# Patient Record
Sex: FEMALE | Race: WHITE
[De-identification: names, ages, dates, MRNs, and addresses within clinical notes are randomized per-mention and may not be internally consistent; named-entity substitution may affect disease eponyms.]

---

## 2018-06-08 ENCOUNTER — HOSPITAL ENCOUNTER (INPATIENT)
Dept: HOSPITAL 88 - ER | Age: 63
LOS: 5 days | Discharge: HOME | DRG: 389 | End: 2018-06-13
Attending: INTERNAL MEDICINE | Admitting: INTERNAL MEDICINE
Payer: COMMERCIAL

## 2018-06-08 VITALS — BODY MASS INDEX: 26.84 KG/M2 | HEIGHT: 66 IN | WEIGHT: 167 LBS

## 2018-06-08 VITALS — SYSTOLIC BLOOD PRESSURE: 119 MMHG | DIASTOLIC BLOOD PRESSURE: 56 MMHG

## 2018-06-08 DIAGNOSIS — R73.9: ICD-10-CM

## 2018-06-08 DIAGNOSIS — E16.2: ICD-10-CM

## 2018-06-08 DIAGNOSIS — E03.9: ICD-10-CM

## 2018-06-08 DIAGNOSIS — K51.90: ICD-10-CM

## 2018-06-08 DIAGNOSIS — K56.50: Primary | ICD-10-CM

## 2018-06-08 DIAGNOSIS — N39.0: ICD-10-CM

## 2018-06-08 DIAGNOSIS — E66.9: ICD-10-CM

## 2018-06-08 DIAGNOSIS — E78.5: ICD-10-CM

## 2018-06-08 DIAGNOSIS — Z85.038: ICD-10-CM

## 2018-06-08 LAB
ALBUMIN SERPL-MCNC: 4.1 G/DL (ref 3.5–5)
ALBUMIN/GLOB SERPL: 1.2 {RATIO} (ref 0.8–2)
ALP SERPL-CCNC: 137 IU/L (ref 40–150)
ALT SERPL-CCNC: 30 IU/L (ref 0–55)
ANION GAP SERPL CALC-SCNC: 14 MMOL/L (ref 8–16)
BACTERIA URNS QL MICRO: (no result) /HPF
BASOPHILS # BLD AUTO: 0 10*3/UL (ref 0–0.1)
BASOPHILS NFR BLD AUTO: 0.4 % (ref 0–1)
BILIRUB UR QL: NEGATIVE
BUN SERPL-MCNC: 20 MG/DL (ref 7–26)
BUN/CREAT SERPL: 22 (ref 6–25)
CALCIUM SERPL-MCNC: 10.8 MG/DL (ref 8.4–10.2)
CAOX CRY URNS QL MICRO: (no result)
CHLORIDE SERPL-SCNC: 103 MMOL/L (ref 98–107)
CK MB SERPL-MCNC: 1.7 NG/ML (ref 0–5)
CK SERPL-CCNC: 34 IU/L (ref 29–168)
CLARITY UR: (no result)
CO2 SERPL-SCNC: 30 MMOL/L (ref 22–29)
COLOR UR: YELLOW
DEPRECATED APTT PLAS QN: 24.1 SECONDS (ref 23.8–35.5)
DEPRECATED INR PLAS: 0.96
DEPRECATED NEUTROPHILS # BLD AUTO: 7.3 10*3/UL (ref 2.1–6.9)
DEPRECATED RBC URNS MANUAL-ACNC: (no result) /HPF (ref 0–5)
EGFRCR SERPLBLD CKD-EPI 2021: > 60 ML/MIN (ref 60–?)
EOSINOPHIL # BLD AUTO: 0.1 10*3/UL (ref 0–0.4)
EOSINOPHIL NFR BLD AUTO: 0.8 % (ref 0–6)
EPI CELLS URNS QL MICRO: (no result) /LPF
ERYTHROCYTE [DISTWIDTH] IN CORD BLOOD: 13.7 % (ref 11.7–14.4)
GLOBULIN PLAS-MCNC: 3.4 G/DL (ref 2.3–3.5)
GLUCOSE SERPLBLD-MCNC: 129 MG/DL (ref 74–118)
HCT VFR BLD AUTO: 44.8 % (ref 34.2–44.1)
HGB BLD-MCNC: 14.6 G/DL (ref 12–16)
KETONES UR QL STRIP.AUTO: (no result)
LEUKOCYTE ESTERASE UR QL STRIP.AUTO: (no result)
LYMPHOCYTES # BLD: 1.2 10*3/UL (ref 1–3.2)
LYMPHOCYTES NFR BLD AUTO: 13.6 % (ref 18–39.1)
MCH RBC QN AUTO: 28.4 PG (ref 28–32)
MCHC RBC AUTO-ENTMCNC: 32.6 G/DL (ref 31–35)
MCV RBC AUTO: 87.2 FL (ref 81–99)
MONOCYTES # BLD AUTO: 0.5 10*3/UL (ref 0.2–0.8)
MONOCYTES NFR BLD AUTO: 5.4 % (ref 4.4–11.3)
MUCOUS THREADS URNS QL MICRO: (no result)
NEUTS SEG NFR BLD AUTO: 79.5 % (ref 38.7–80)
NITRITE UR QL STRIP.AUTO: NEGATIVE
PLATELET # BLD AUTO: 319 X10E3/UL (ref 140–360)
POTASSIUM SERPL-SCNC: 4 MMOL/L (ref 3.5–5.1)
PROT UR QL STRIP.AUTO: (no result)
PROTHROMBIN TIME: 12 SECONDS (ref 11.9–14.5)
RBC # BLD AUTO: 5.14 X10E6/UL (ref 3.6–5.1)
SODIUM SERPL-SCNC: 143 MMOL/L (ref 136–145)
SP GR UR STRIP: 1.01 (ref 1.01–1.02)
UROBILINOGEN UR STRIP-MCNC: 0.2 MG/DL (ref 0.2–1)

## 2018-06-08 PROCEDURE — 82553 CREATINE MB FRACTION: CPT

## 2018-06-08 PROCEDURE — 82550 ASSAY OF CK (CPK): CPT

## 2018-06-08 PROCEDURE — 85610 PROTHROMBIN TIME: CPT

## 2018-06-08 PROCEDURE — 85025 COMPLETE CBC W/AUTO DIFF WBC: CPT

## 2018-06-08 PROCEDURE — 84484 ASSAY OF TROPONIN QUANT: CPT

## 2018-06-08 PROCEDURE — 80053 COMPREHEN METABOLIC PANEL: CPT

## 2018-06-08 PROCEDURE — 99285 EMERGENCY DEPT VISIT HI MDM: CPT

## 2018-06-08 PROCEDURE — 80061 LIPID PANEL: CPT

## 2018-06-08 PROCEDURE — 83036 HEMOGLOBIN GLYCOSYLATED A1C: CPT

## 2018-06-08 PROCEDURE — 85730 THROMBOPLASTIN TIME PARTIAL: CPT

## 2018-06-08 PROCEDURE — 74177 CT ABD & PELVIS W/CONTRAST: CPT

## 2018-06-08 PROCEDURE — 74022 RADEX COMPL AQT ABD SERIES: CPT

## 2018-06-08 PROCEDURE — 96361 HYDRATE IV INFUSION ADD-ON: CPT

## 2018-06-08 PROCEDURE — 36415 COLL VENOUS BLD VENIPUNCTURE: CPT

## 2018-06-08 PROCEDURE — 81001 URINALYSIS AUTO W/SCOPE: CPT

## 2018-06-08 PROCEDURE — 93005 ELECTROCARDIOGRAM TRACING: CPT

## 2018-06-08 PROCEDURE — 74018 RADEX ABDOMEN 1 VIEW: CPT

## 2018-06-08 RX ADMIN — SODIUM CHLORIDE PRN MG: 900 INJECTION INTRAVENOUS at 23:43

## 2018-06-08 RX ADMIN — HYDROMORPHONE HYDROCHLORIDE PRN MG: 1 INJECTION, SOLUTION INTRAMUSCULAR; INTRAVENOUS; SUBCUTANEOUS at 23:43

## 2018-06-08 RX ADMIN — SODIUM CHLORIDE SCH MLS/HR: 9 INJECTION, SOLUTION INTRAVENOUS at 21:20

## 2018-06-08 RX ADMIN — TAZOBACTAM SODIUM AND PIPERACILLIN SODIUM SCH MLS/HR: 375; 3 INJECTION, SOLUTION INTRAVENOUS at 20:21

## 2018-06-08 RX ADMIN — SODIUM CHLORIDE SCH ML: 900 IRRIGANT IRRIGATION at 19:57

## 2018-06-08 RX ADMIN — FAMOTIDINE SCH MG: 10 INJECTION, SOLUTION INTRAVENOUS at 20:21

## 2018-06-08 RX ADMIN — SODIUM CHLORIDE SCH ML: 900 IRRIGANT IRRIGATION at 19:58

## 2018-06-08 NOTE — DIAGNOSTIC IMAGING REPORT
PROCEDURE: CT ABDOMEN AND PELVIS WITH CONTRAST

 

TECHNIQUE: 

The abdomen and pelvis were scanned utilizing a multidetector helical 

scanner from the diaphragm to the lesser trochanter after the IV 

administration of 100 cc of Isovue 370 and the oral administration of 

water.  Coronal and sagittal multiplanar reformations were obtained.

 

COMPARISON: CT of the abdomen and pelvis from 5/26/2017 and 7/1/2016

 

INDICATIONS:   MID ABDOMINAL PAIN

 

FINDINGS:

LOWER THORAX: Subsegmental atelectasis in the lung bases.

 

HEPATOBILIARY: No focal hepatic lesions.  No biliary ductal dilatation.

 

SPLEEN: No splenomegaly.

 

PANCREAS: No focal masses or ductal dilatation.

 

ADRENALS: No right adrenal nodules. There is diffuse thickening of the 

left adrenal gland, likely due to hyperplasia.

 

KIDNEYS/URETERS: No hydronephrosis, stones, or solid mass lesions.

 

PELVIC ORGANS/BLADDER: The uterus is absent. The bladder is 

unremarkable.

 

PERITONEUM / RETROPERITONEUM: No free air or fluid.

 

LYMPH NODES: No lymphadenopathy.

 

VESSELS: Moderate atherosclerotic calcification of the aorta and its 

branches.

 

GI TRACT: Post surgical changes of prior low anterior resection with 

anastomotic suture line in the low rectum.

 

There is diffuse dilatation of the distal jejunal and ileum, measuring 

up to 3.4 cm. There is a transition point in the pelvis in the distal 

ileum (series 2, image 55).

 

BONES AND SOFT TISSUES: Bilateral breast implants are partially 

visualized.

 

IMPRESSION:

 

Small bowel obstruction with transition point in the pelvis, in the 

distal ileum. Suspect adhesions as an etiology.

 

Overall, the findings appear very similar to the prior study from 

5/26/2017. 

 

Dictated by:  Esau Klein M.D. on 6/08/2018 at 18:55     

Electronically approved by:  Esau Klein M.D. on 6/08/2018 at 18:55

## 2018-06-08 NOTE — XMS REPORT
Patient Summary Document

 Created on: 2018



KARIE JOSEPH

External Reference #: [id number]

: 1955

Sex: Female



Demographics







 Address  [address] [place] [address]

 

 Home Phone  [phone]

 

 Preferred Language  Unknown

 

 Marital Status  Unknown

 

 Quaker Affiliation  Unknown

 

 Race  Unknown

 

 Additional Race(s)  

 

 Ethnic Group  Unknown





Author







 Author  [facility]

 

 Address  Unknown

 

 Phone  Unavailable







Care Team Providers







 Care Team Member Name  Role  Phone

 

 TERESA HALEY  Unavailable  Unavailable







Problems

This patient has no known problems.



Allergies, Adverse Reactions, Alerts

This patient has no known allergies or adverse reactions.



Medications

This patient has no known medications.



Results







 Test Description  Test Time  Test Comments  Text Results  Atomic Results  
Result Comments









 ABDOMEN COMP INCL UPR or DECUB            [place] [address]      Patient Name: 
KARIE JOSEPH   MR #: [id number]    : 1955 Age/Sex: 62/F  Acct #: 
[id number] Req #: [id number]  Adm Physician: TEREAS HALEY MD    Ordered by: 
PETRA YEAGER MD  Report #: [id number]   Location: MED/SURG  Room/Bed: [facility]    ___________________________________________________________________________
________________________    Procedure: [id number] DX/ABDOMEN COMP INCL UPR or 
DECUB  Exam Date: 17                            Exam Time: 0555       
REPORT STATUS: Signed    EXAM:  ABDOMEN COMP INCL UPR or DECUB, supine and 
erect   DATE: 2017 5:00 AM  Time stamp on exam: 0545 hours   INDICATION: 
Small bowel obstruction   COMPARISON: KUB 2017      FINDINGS:   LINES
/TUBES: None      BOWEL PATTERN: Resolution of small bowel dilation. Oral 
contrast seen in the   ascending and transverse colon.      SOFT TISSUES: 
Surgical sutures project over the pelvis.      LUNG BASES: Not included      
BONES: No acute findings.      IMPRESSION:   No evidence of bowel obstruction. 
                 Signed by: Dr. Arabella Molina M.D. on 2017 6:32 AM   
     Dictated By: ARABELLA MOLINA MD  Electronically Signed By: ARABELLA MOLINA MD 
on 17  Transcribed By: CUONG on 17       COPY TO:   
PETRA YEAGER MD           

 

 ABDOMEN COMP INCL UPR or DECUB            [place] [address]      Patient Name: 
KARIE JOSEPH   MR #: [id number]    : 1955 Age/Sex: 62/F  Acct #: 
[id number] Req #: [id number]  Adm Physician: TERESA HALEY MD    Ordered by: 
PETRA YEAGER MD  Report #: [id number]   Location: MED/SURG  Room/Bed: [facility]    ___________________________________________________________________________
________________________    Procedure: [id number] DX/ABDOMEN COMP INCL UPR or 
DECUB  Exam Date: 17                            Exam Time: 0532       
REPORT STATUS: Signed    EXAM:  ABDOMEN COMP INCL UPR or DECUB, supine and 
erect   DATE: 2017 5:00 AM  Time stamp on exam: 0532 hours   INDICATION: 
Small bowel obstruction   COMPARISON: KUB 2017      FINDINGS:   LINES
/TUBES: None      BOWEL PATTERN: Stable mildly dilated loops of small bowel. 
Residual oral   contrast seen in the right colon.      SOFT TISSUES: No 
abnormal calcifications. No mass effect.      LUNG BASES: Bibasilar atelectasis
      BONES: No acute findings.      IMPRESSION:   No interval change.   
Partial small bowel obstruction versus ileus.                  Signed by: Dr. Arabella Molina M.D. on 2017 6:13 AM        Dictated By: ARABELLA MOLINA MD  Electronically Signed By: ARABELLA MOLINA MD on 17  Transcribed By
: CUONG on 17       COPY TO:   PETRA YEAGER MD           

 

 ABDOMEN COMP INCL UPR or DECUB            [place] [address]      Patient Name: 
KARIE JOSEPH   MR #: [id number]    : 1955 Age/Sex: 62/F  Acct #: 
[id number] Req #: [id number]  Adm Physician: TERESA HALEY MD    Ordered by: 
PETRA YEAGER MD  Report #: [id number]   Location: MED/SURG  Room/Bed: [facility]    ___________________________________________________________________________
________________________    Procedure: [id number] DX/ABDOMEN COMP INCL UPR or 
DECUB  Exam Date: 17                            Exam Time: 627       
REPORT STATUS: Signed    EXAM:  ABDOMEN COMP INCL UPR or DECUB, supine and 
erect   DATE: 2017 5:00 AM  Time stamp on exam: 0627 hours   INDICATION: 
Small bowel obstruction   COMPARISON: KUB 2017      FINDINGS:   LINES
/TUBES: The nasal/orogastric tube is no longer visualized.      BOWEL PATTERN: 
Mildly distended loop of small bowel in the pelvis to 3.5 cm.   Oral contrast 
is seen in the right and transverse colon.      SOFT TISSUES: No abnormal 
calcifications. No mass effect.      LUNG BASES: Bibasilar atelectasis.      
BONES: No acute findings.      IMPRESSION:   Mildly distended loops of small 
bowel in the pelvis to 3.5 cm. And could   represent ileus or partial small 
bowel obstruction.                  Signed by: Dr. Arabella Molina M.D. on  6:56 AM        Dictated By: ARABELLA MOLINA MD  Electronically Signed By: 
ARABELLA MOLINA MD on 17  Transcribed By: CUONG on 17    
   COPY TO:   PETRA YEAGER MD           

 

 ABDOMEN COMP INCL UPR or DECUB            [place] [address]      Patient Name: 
KARIE JOSEPH   MR #: [id number]    : 1955 Age/Sex: 62/F  Acct #: 
[id number] Req #: [id number]  Adm Physician: TERESA HALEY MD    Ordered by: 
PETRA YEAGER MD  Report #: [id number]   Location: MED/SURG  Room/Bed: [facility]    ___________________________________________________________________________
________________________    Procedure: [id number] DX/ABDOMEN COMP INCL UPR or 
DECUB  Exam Date: 17                            Exam Time: 05       
REPORT STATUS: Signed    EXAM:  ABDOMEN COMP INCL UPR or DECUB, supine and 
erect   DATE: 2017 5:00 AM  Time stamp on exam: 0522 hours   INDICATION: 
Small bowel obstruction   COMPARISON: KUB 2017      FINDINGS:   LINES
/TUBES: Stable position of nasal/orogastric tube.      BOWEL PATTERN: Oral 
contrast is seen throughout the colon. General paucity of   small bowel gas.   
   SOFT TISSUES: Surgical sutures lower pelvis.      LUNG BASES: Bibasilar 
atelectasis.      BONES: No acute findings.      IMPRESSION:   No findings to 
suggest bowel obstruction.                  Signed by: Dr. Arabella Molina M.D. on 2017 6:24 AM        Dictated By: ARABELLA MOLINA MD  Electronically 
Signed By: ARABELLA MOLINA MD on 17  Transcribed By: CUONG on 624       COPY TO:   PETRA YEAGER MD           

 

 ABDOMEN COMP INCL UPR or DECUB            [place] [address]      Patient Name: 
KARIE JOSEPH   MR #: [id number]    : 1955 Age/Sex: 62/F  Acct #: 
[id number] Req #: [id number]  Adm Physician: TERESA HALEY MD    Ordered by: 
PETRA YEAGER MD  Report #: [id number]   Location: MED/SURG  Room/Bed: [facility]    ___________________________________________________________________________
________________________    Procedure: [id number] DX/ABDOMEN COMP INCL UPR or 
DECUB  Exam Date:                             Exam Time:        REPORT STATUS: 
Signed    EXAM:  ABDOMEN COMP INCL UPR or DECUB   DATE: 2017 5:00 AM  Time 
stamp on exam: 6 hours a.m.   INDICATION: Bowel obstruction.       COMPARISON: 
CT of the abdomen and pelvis in 2017 and KUB on 2017         FINDINGS
:   LINES/TUBES: NG tube is visualized with tip overlying the left upper 
quadrant      BOWEL PATTERN: Interval increase in distention of mid portion of 
the small   bowel measuring 4.4 cm in diameter. However, the oral contrast has 
reached the   colon.      SOFT TISSUES: No abnormal calcifications. No mass 
effect.      LUNG BASES: Not included      BONES: No acute findings.      
IMPRESSION:   Persistent segmental dilatation of the mid small bowel. However, 
the oral   contrast has reached the colon suggestive of partial small bowel 
obstruction         Signed by: Dr. Chino Mathews M.D. on 2017 6:46 AM      
  Dictated By: CHINO CARRASCO MD  Electronically Signed By: CHINO MANNING MD on 17  Transcribed By: CUONG on 17       COPY 
TO:   PETRA YEAGER MD           

 

 ABDOMEN-1VIEW (KUB)            [place] [address]      Patient Name: KARIE JOSEPH
   MR #: [id number]    : 1955 Age/Sex: 62/F  Acct #: [id number] Req #
: [id number]  Adm Physician: TERESA HALEY MD    Ordered by: AVIS STUBBS MD  
Report #: [id number]   Location: MED/SURG  Room/Bed: [facility]    ___________________
_______________________________________________________________________________
_    Procedure: [id number] DX/ABDOMEN-1VIEW (KUB)  Exam Date: 17          
                  Exam Time: 1715       REPORT STATUS: Signed    EXAM: Abdomen, 
1  Views      INDICATION:  Pain.      COMPARISON: CT abdomen and pelvis 2017.      FINDINGS:      LINES:  Enteric feeding catheter is present with the 
tip projecting over the   expected region of the gastric fundus.      Bowel: No 
air fluid levels..  No pneumoperitoneum..      Moderate amount of retained 
feces and air are noted in the colon and rectum.      No calcifications project 
over the renal shadows, expected course of the   ureters bilaterally, and 
bladder.          Soft tissues:  Normal.      Bones: No acute osseous 
abnormality.        Impression:    No acute radiographic abnormality.      
Signed by: Dr. Janessa Nance M.D. on 2017 5:31 PM        Dictated By: JANESSA NANCE MD  Electronically Signed By: JANESSA NANCE MD on 17  
Transcribed By: CUONG on 17       COPY TO:   AVIS STUBBS MD      
     

 

 CT ABDOMEN/PELVIS [place] [address]      Patient Name: KARIE JOSEPH
   MR #: [id number]    : 1955 Age/Sex: 62/F  Acct #: [id number] Req #
: [id number]  Adm Physician:     Ordered by: AVIS STBUBS MD  Report #: [id number]
[id number]   Location: ER  Room/Bed:     _____________________________________________
______________________________________________________    Procedure: [id number] 
CT/CT ABDOMEN/PELVIS WO  Exam Date: 17                            Exam 
Time: 1507       REPORT STATUS: Signed    EXAM: CT Abdomen and Pelvis WITHOUT 
contrast        INDICATION: Abdominal pain   COMPARISON: CT abdomen and pelvis    TECHNIQUE: Abdomen and pelvis were scanned utilizing a multidetector 
helical   scanner from the lung base to the pubic symphysis. Coronal and 
sagittal   reformations were obtained.  The lack of intravenous contrast limits 
the   evaluation of the solid organs, vasculature, and possible 
lymphadenopathy.      Protocol:   General survey without contrast   IV CONTRAST
: No intravenous contrast was administered as per physician request.   ORAL 
CONTRAST:  None.   COMPLICATIONS: None.     RADIATION DOSE:  Total Exam DLP: 
224.4 mGy*cm.   CTDIvol has been reviewed. It is below the limits set by the 
Radiation Protocol   Committee (RPC).      FINDINGS:      LINES: None.      
Lower thorax: No parenchymal abnormality.  No pneumothorax.  No pleural   
effusion.  Bilateral breast prostheses.          Liver: No focal mass.  No 
hepatomegaly.  Normal parenchyma.   Gallbladder:  No gallstones. No gallbladder 
distention.   Biliary tree:  No intrahepatic duct dilation. No extrahepatic 
duct dilation.   Spleen:  No splenomegaly. No focal mass.   Pancreas: No focal 
mass.  Normal pancreatic duct.  No peripancreatic   inflammatory changes.      
Kidneys:  No obstructing calculi.  No hydronephrosis. No cysts.  No perinephric
   soft tissue inflammatory changes.       Adrenal glands:  No adrenal 
nodules..     Bladder:  Normal urinary bladder.     Pelvic organs:  
Hysterectomy. No ovaries are visualized.       GI:  No bowel wall thickening.  
Multiple fluid-filled distended loops of small   bowel are present, with a 
likely transition zone in the lower pelvis.  Stomach   is normal.  
Postoperative changes of the sigmoid colon.  No appendix is   visualized.  A 
moderate amount of retained feces limits intraluminal evaluation   of the 
colon.      Peritoneum/retroperitoneum:  No pneumoperitoneum.  No ascites.  No 
drainable   fluid collection.    Lymph nodes:   No lymphadenopathy.    .      
Vessels: No focal abnormality. Atherosclerotic calcifications.  Limited   
evaluation.      Bones:  No focal abnormality.  Minimal degenerative changes of 
the lumbar   spine.   Soft tissues:  No focal abnormality.       IMPRESSION:    
Fluid-filled dilated loops of small bowel consistent with partial small bowel   
obstruction, with the transition zone likely located in the lower pelvis.      
          Signed by: Dr. Janessa Nance M.D. on 2017 3:49 PM        Dictated 
By: JANESSA NANCE MD  Electronically Signed By: JANESSA NANCE MD on 17  
Transcribed By: CUONG on 17       COPY TO:   AVIS STUBBS MD      
     

 

 CHEST SINGLE (PORTABLE)            [place] [address]      Patient Name: KARIE JOSEPH   MR #: [id number]    : 1955 Age/Sex: 62/F  Acct #: 
[id number] Req #: [id number]  Adm Physician: TERESA HALEY MD    Ordered by: 
AVIS STUBBS MD  Report #: [id number]   Location: MED/SURG  Room/Bed: [facility]    _
________________________________________________________________________________
__________________    Procedure: [id number] DX/CHEST SINGLE (PORTABLE)  Exam Date
: 17                            Exam Time: 1250       REPORT STATUS: 
Signed    EXAMINATION: Chest,  CHEST SINGLE (PORTABLE)          INDICATION: 
Bowel obstruction      COMPARISON:  Abdominal series with PA chest 7/3/2016    
       FINDINGS:          LINES:  Left subclavian chest port with tip 
projecting over the expected region   of the left brachiocephalic vein.      
Heart:  Normal cardiac silhouette.      Vascular: The pulmonary vasculature is 
within normal limits.        Mediastinum: No mediastinal, hilar, or axillary 
mass or lymphadenopathy.      Lungs: No parenchymal mass.  No focal 
consolidation.      Pleura:  No pleural effusion.  No pneumothorax.      Bones: 
No acute osseous abnormality.  Degenerative changes of the thoracic   spine.   
   Soft tissues:  Normal.      Impression:    No acute radiographic 
abnormality.      Signed by: Dr. Janessa Nance M.D. on 2017 1:23 PM        
Dictated By: JANESSA NANCE MD  Electronically Signed By: JANESSA NANCE MD on  1323  Transcribed By: CUONG on 17 1323       COPY TO:   AVIS STUBBS MD

## 2018-06-08 NOTE — DIAGNOSTIC IMAGING REPORT
EXAM:  ABDOMEN-1VIEW (KUB)

DATE: 6/8/2018 10:21 PM  Time stamp on exam: 2226 hours

INDICATION: NG tube repositioning    

COMPARISON: 6/8/2018 at 2112 hours





FINDINGS:

LINES/TUBES: NG tube is visualized in the location of the distal mediastinum at

the level of the left T11-T12. Advancement is recommended. Chest port is

partially visualized with tip at the proximal SVC.



BOWEL PATTERN: No evidence for obstruction.



SOFT TISSUES: There is opacification of the bilateral renal collecting systems

without evidence of hydronephrosis. The urinary bladder is partially opacified.

There are sutures in the region of the lower pelvis.



LUNG BASES: Lung bases are clear.



BONES: No acute findings.



IMPRESSION:

1.  No evidence of small bowel obstruction.

2.  NG tube needs to be advanced



Signed by: Dr. Chino Mathews M.D. on 6/8/2018 11:20 PM

## 2018-06-08 NOTE — DIAGNOSTIC IMAGING REPORT
EXAM:  ABDOMEN-1VIEW (KUB)

DATE: 6/8/2018 9:00 PM  Time stamp on exam: 2112 hours

INDICATION: NG tube placement    

COMPARISON: None





FINDINGS:

LINES/TUBES: T12 NG tube is visualized in the location of the distal

mediastinum at the level of the left T10 transverse process. Advancement is

recommended.



BOWEL PATTERN: No evidence for obstruction.



SOFT TISSUES: There is opacification of the bilateral renal collecting systems

without evidence of hydronephrosis. The urinary bladder is partially opacified.

There are sutures in the region of the lower pelvis.



LUNG BASES: Not included



BONES: No acute findings.



IMPRESSION:

1.  No evidence of small bowel obstruction.

2.  NG tube needs to be advanced





Signed by: Dr. Chino Mathews M.D. on 6/8/2018 9:30 PM

## 2018-06-09 VITALS — SYSTOLIC BLOOD PRESSURE: 139 MMHG | DIASTOLIC BLOOD PRESSURE: 64 MMHG

## 2018-06-09 VITALS — SYSTOLIC BLOOD PRESSURE: 117 MMHG | DIASTOLIC BLOOD PRESSURE: 61 MMHG

## 2018-06-09 VITALS — DIASTOLIC BLOOD PRESSURE: 84 MMHG | SYSTOLIC BLOOD PRESSURE: 151 MMHG

## 2018-06-09 VITALS — DIASTOLIC BLOOD PRESSURE: 55 MMHG | SYSTOLIC BLOOD PRESSURE: 115 MMHG

## 2018-06-09 VITALS — DIASTOLIC BLOOD PRESSURE: 57 MMHG | SYSTOLIC BLOOD PRESSURE: 157 MMHG

## 2018-06-09 VITALS — SYSTOLIC BLOOD PRESSURE: 115 MMHG | DIASTOLIC BLOOD PRESSURE: 55 MMHG

## 2018-06-09 VITALS — SYSTOLIC BLOOD PRESSURE: 142 MMHG | DIASTOLIC BLOOD PRESSURE: 64 MMHG

## 2018-06-09 LAB
ALBUMIN SERPL-MCNC: 3.2 G/DL (ref 3.5–5)
ALBUMIN/GLOB SERPL: 1.2 {RATIO} (ref 0.8–2)
ALP SERPL-CCNC: 102 IU/L (ref 40–150)
ALT SERPL-CCNC: 22 IU/L (ref 0–55)
ANION GAP SERPL CALC-SCNC: 13.1 MMOL/L (ref 8–16)
BASOPHILS # BLD AUTO: 0 10*3/UL (ref 0–0.1)
BASOPHILS NFR BLD AUTO: 0.4 % (ref 0–1)
BUN SERPL-MCNC: 17 MG/DL (ref 7–26)
BUN/CREAT SERPL: 22 (ref 6–25)
CALCIUM SERPL-MCNC: 9.2 MG/DL (ref 8.4–10.2)
CHLORIDE SERPL-SCNC: 108 MMOL/L (ref 98–107)
CHOLEST SERPL-MCNC: 101 MD/DL (ref 0–199)
CHOLEST/HDLC SERPL: 3.1 {RATIO} (ref 3–3.6)
CO2 SERPL-SCNC: 26 MMOL/L (ref 22–29)
DEPRECATED NEUTROPHILS # BLD AUTO: 2.7 10*3/UL (ref 2.1–6.9)
EGFRCR SERPLBLD CKD-EPI 2021: > 60 ML/MIN (ref 60–?)
EOSINOPHIL # BLD AUTO: 0.1 10*3/UL (ref 0–0.4)
EOSINOPHIL NFR BLD AUTO: 2.8 % (ref 0–6)
ERYTHROCYTE [DISTWIDTH] IN CORD BLOOD: 14 % (ref 11.7–14.4)
GLOBULIN PLAS-MCNC: 2.7 G/DL (ref 2.3–3.5)
GLUCOSE SERPLBLD-MCNC: 94 MG/DL (ref 74–118)
HCT VFR BLD AUTO: 37.5 % (ref 34.2–44.1)
HDLC SERPL-MSCNC: 33 MG/DL (ref 40–60)
HGB BLD-MCNC: 11.8 G/DL (ref 12–16)
LDLC SERPL CALC-MCNC: 48 MG/DL (ref 60–130)
LYMPHOCYTES # BLD: 1.4 10*3/UL (ref 1–3.2)
LYMPHOCYTES NFR BLD AUTO: 27.4 % (ref 18–39.1)
MCH RBC QN AUTO: 28.3 PG (ref 28–32)
MCHC RBC AUTO-ENTMCNC: 31.5 G/DL (ref 31–35)
MCV RBC AUTO: 89.9 FL (ref 81–99)
MONOCYTES # BLD AUTO: 0.8 10*3/UL (ref 0.2–0.8)
MONOCYTES NFR BLD AUTO: 16.1 % (ref 4.4–11.3)
NEUTS SEG NFR BLD AUTO: 53.3 % (ref 38.7–80)
PLATELET # BLD AUTO: 227 X10E3/UL (ref 140–360)
POTASSIUM SERPL-SCNC: 4.1 MMOL/L (ref 3.5–5.1)
RBC # BLD AUTO: 4.17 X10E6/UL (ref 3.6–5.1)
SODIUM SERPL-SCNC: 143 MMOL/L (ref 136–145)
TRIGL SERPL-MCNC: 101 MG/DL (ref 0–149)

## 2018-06-09 RX ADMIN — SODIUM CHLORIDE SCH ML: 900 IRRIGANT IRRIGATION at 08:18

## 2018-06-09 RX ADMIN — SODIUM CHLORIDE SCH MLS/HR: 9 INJECTION, SOLUTION INTRAVENOUS at 12:08

## 2018-06-09 RX ADMIN — SODIUM CHLORIDE PRN MG: 900 INJECTION INTRAVENOUS at 22:54

## 2018-06-09 RX ADMIN — SODIUM CHLORIDE SCH ML: 900 IRRIGANT IRRIGATION at 15:45

## 2018-06-09 RX ADMIN — SODIUM CHLORIDE PRN MG: 900 INJECTION INTRAVENOUS at 08:18

## 2018-06-09 RX ADMIN — SODIUM CHLORIDE SCH MLS/HR: 9 INJECTION, SOLUTION INTRAVENOUS at 19:31

## 2018-06-09 RX ADMIN — SODIUM CHLORIDE SCH MLS/HR: 9 INJECTION, SOLUTION INTRAVENOUS at 03:56

## 2018-06-09 RX ADMIN — HYDROMORPHONE HYDROCHLORIDE PRN MG: 1 INJECTION, SOLUTION INTRAMUSCULAR; INTRAVENOUS; SUBCUTANEOUS at 04:03

## 2018-06-09 RX ADMIN — HYDROMORPHONE HYDROCHLORIDE PRN MG: 1 INJECTION, SOLUTION INTRAMUSCULAR; INTRAVENOUS; SUBCUTANEOUS at 22:54

## 2018-06-09 RX ADMIN — SODIUM CHLORIDE SCH ML: 900 IRRIGANT IRRIGATION at 20:23

## 2018-06-09 RX ADMIN — FAMOTIDINE SCH MG: 10 INJECTION, SOLUTION INTRAVENOUS at 17:44

## 2018-06-09 RX ADMIN — HYDROMORPHONE HYDROCHLORIDE PRN MG: 1 INJECTION, SOLUTION INTRAMUSCULAR; INTRAVENOUS; SUBCUTANEOUS at 17:44

## 2018-06-09 RX ADMIN — TAZOBACTAM SODIUM AND PIPERACILLIN SODIUM SCH MLS/HR: 375; 3 INJECTION, SOLUTION INTRAVENOUS at 20:23

## 2018-06-09 RX ADMIN — TAZOBACTAM SODIUM AND PIPERACILLIN SODIUM SCH MLS/HR: 375; 3 INJECTION, SOLUTION INTRAVENOUS at 03:56

## 2018-06-09 RX ADMIN — SODIUM CHLORIDE PRN MG: 900 INJECTION INTRAVENOUS at 17:44

## 2018-06-09 RX ADMIN — HYDROMORPHONE HYDROCHLORIDE PRN MG: 1 INJECTION, SOLUTION INTRAMUSCULAR; INTRAVENOUS; SUBCUTANEOUS at 13:05

## 2018-06-09 RX ADMIN — SODIUM CHLORIDE SCH ML: 900 IRRIGANT IRRIGATION at 12:08

## 2018-06-09 RX ADMIN — LEVOTHYROXINE SODIUM ANHYDROUS SCH MCG: 100 INJECTION, POWDER, LYOPHILIZED, FOR SOLUTION INTRAVENOUS at 08:58

## 2018-06-09 RX ADMIN — SODIUM CHLORIDE SCH ML: 900 IRRIGANT IRRIGATION at 02:27

## 2018-06-09 RX ADMIN — BISACODYL SCH MG: 10 SUPPOSITORY RECTAL at 20:23

## 2018-06-09 RX ADMIN — TAZOBACTAM SODIUM AND PIPERACILLIN SODIUM SCH MLS/HR: 375; 3 INJECTION, SOLUTION INTRAVENOUS at 12:08

## 2018-06-09 RX ADMIN — SODIUM CHLORIDE PRN MG: 900 INJECTION INTRAVENOUS at 04:03

## 2018-06-09 RX ADMIN — SODIUM CHLORIDE SCH ML: 900 IRRIGANT IRRIGATION at 19:53

## 2018-06-09 RX ADMIN — HYDROMORPHONE HYDROCHLORIDE PRN MG: 1 INJECTION, SOLUTION INTRAMUSCULAR; INTRAVENOUS; SUBCUTANEOUS at 08:18

## 2018-06-09 NOTE — DIAGNOSTIC IMAGING REPORT
EXAM:  ABDOMEN-1VIEW (KUB)

DATE: 6/8/2018 11:51 PM  Time stamp on exam: 0014 hours of 6/9/2018

INDICATION: NG tube placement    

COMPARISON: 6/8/2018 at 2112 hours and at 2226 hours





FINDINGS:

LINES/TUBES: NG tube is visualized with tip now at the level of the GE junction

and sidehole within the distal esophagus. Repositioning is recommended. 



BOWEL PATTERN: No evidence for obstruction.



SOFT TISSUES: There is opacification of the bilateral renal collecting systems

without evidence of hydronephrosis. The urinary bladder is partially opacified.

There are sutures in the region of the lower pelvis.



LUNG BASES: Lung bases are clear.



BONES: No acute findings.



IMPRESSION:

1.  No evidence of small bowel obstruction.

2.  NG tube needs to be advanced



Signed by: Dr. Chino Mathews M.D. on 6/9/2018 12:30 AM

## 2018-06-09 NOTE — HISTORY AND PHYSICAL
PRIMARY CARE PHYSICIAN:  Dr. Morales



GASTROENTEROLOGIST:  Dr. Blue



CHIEF COMPLAINT:  Abdominal pain, nausea and vomiting.



HISTORY OF PRESENT ILLNESS:  This is a 63-year-old woman with a history of 

colon cancer diagnosed in , and is status post surgical resection and 

chemoradiation therapy with recurrent small-bowel obstruction, now 

developing abdominal pain, nausea and vomiting prompting her visit to the 

hospital.  Found to have small-bowel obstruction.  NG tube was placed.  She 

was admitted for further evaluation and management.  Last bowel movement 

was yesterday, which was small.



PAST MEDICAL HISTORY:  Colon cancer in .  She is status post 

chemoradiation therapy and resection of the mass, ulcerative colitis, 

recurrent small-bowel obstruction, hypertension, hyperlipidemia, 

hypothyroidism.



PAST SURGICAL HISTORY:  Breast augmentation, right wrist fracture repair, 

, tubal ligation, colonic mass resection in , hysterectomy. 



ALLERGIES:  PER ELECTRONIC MEDICAL RECORD.



FAMILY HISTORY/SOCIAL HISTORY:  Patient is single.  She has 2 children.  No 

alcohol, illicits or cigarettes.



MEDICATIONS:  Per electronic medical record.



REVIEW OF SYSTEMS:  Denies any dizziness or chest pain.



PHYSICAL EXAMINATION 

VITAL SIGNS:  Reviewed.  

GENERAL:  A tired-appearing woman resting in bed. 

HEENT:  Anicteric.  She has an NG tube in place. 

CARDIOVASCULAR:  Normal S1 and S2.  

LUNGS:  Moderate breath sounds.  

ABDOMEN:  Soft and nondistended.  She has midabdomen with mild tenderness.  

No rebound or guarding. 

EXTREMITIES:  No edema or calf tenderness.

NEUROLOGICAL:  Alert and oriented times 3.  Moving all extremities.

SKIN:  Dry.

PSYCHIATRIC:  Normal affect.



LABS:  Reviewed.



MEDICATIONS:  Reviewed.



ASSESSMENT AND PLAN:  A 63-year-old woman with:

1.  Small-bowel obstruction:  Will get surgery consultation and 

gastroenterology consultation.  The patient has nasogastric tube.  Will 

ambulate the patient.  Repeat imaging shows small-bowel obstruction may not 

be present.  Will further evaluate. 

2.  History of colon cancer:  Will need followup endoscopy.

3.  Hyperglycemia:  Obtain hemoglobin A1c and lipid panel.

4.  Overweight state:  Screen for diabetes and obtain lipid panel.

5.  Urinary tract infection with low-grade fever:  Will treat with 

antibiotics.

6.  Hypothyroidism:  Will restart Synthroid intravenously.

7.  Ulcerative colitis:  Will defer to gastroenterology services.  May 

benefit from steroids.

8.  Hyperlipidemia:  Will hold statin at this time.

9.  Hypertension:  Will treat with p.r.n. medications.

10. Prophylaxis:  Will continue Pepcid intravenously and use sequential 

compression devices bilaterally.



11. Disposition:  Ambulate the patient.  Follow up gastroenterology 

recommendations.  Conservative management at this time.  Follow up surgical 

recommendations.  









DD:  2018 07:23

DT:  2018 07:48

Job#:  [id number]

## 2018-06-09 NOTE — DIAGNOSTIC IMAGING REPORT
EXAM:  ABDOMEN-1VIEW (KUB)

DATE: 6/9/2018 1:10 AM  Time stamp on exam: 1:17 AM

INDICATION: NG tube placement    

COMPARISON: 6/9/2018 and 6/8/2018





FINDINGS:

LINES/TUBES: NG tube is now visualized in good position overlying the left

upper quadrant in the distribution of the gastric body.



BOWEL PATTERN: No evidence for obstruction.



SOFT TISSUES: Residual opaque contrast noted in the bilateral renal collecting

systems.



LUNG BASES: Lung bases are clear.



BONES: No acute findings.



IMPRESSION:

NG tube now in good position.

Nonobstructive bowel gas pattern.





Signed by: Dr. Chino Mathews M.D. on 6/9/2018 2:10 AM

## 2018-06-10 VITALS — SYSTOLIC BLOOD PRESSURE: 199 MMHG | DIASTOLIC BLOOD PRESSURE: 86 MMHG

## 2018-06-10 VITALS — DIASTOLIC BLOOD PRESSURE: 73 MMHG | SYSTOLIC BLOOD PRESSURE: 141 MMHG

## 2018-06-10 VITALS — DIASTOLIC BLOOD PRESSURE: 79 MMHG | SYSTOLIC BLOOD PRESSURE: 178 MMHG

## 2018-06-10 VITALS — SYSTOLIC BLOOD PRESSURE: 188 MMHG | DIASTOLIC BLOOD PRESSURE: 80 MMHG

## 2018-06-10 VITALS — DIASTOLIC BLOOD PRESSURE: 67 MMHG | SYSTOLIC BLOOD PRESSURE: 155 MMHG

## 2018-06-10 VITALS — SYSTOLIC BLOOD PRESSURE: 178 MMHG | DIASTOLIC BLOOD PRESSURE: 79 MMHG

## 2018-06-10 VITALS — SYSTOLIC BLOOD PRESSURE: 190 MMHG | DIASTOLIC BLOOD PRESSURE: 85 MMHG

## 2018-06-10 VITALS — SYSTOLIC BLOOD PRESSURE: 190 MMHG | DIASTOLIC BLOOD PRESSURE: 83 MMHG

## 2018-06-10 LAB
CK MB SERPL-MCNC: 1.8 NG/ML (ref 0–5)
CK SERPL-CCNC: 46 IU/L (ref 29–168)

## 2018-06-10 RX ADMIN — HYDROMORPHONE HYDROCHLORIDE PRN MG: 1 INJECTION, SOLUTION INTRAMUSCULAR; INTRAVENOUS; SUBCUTANEOUS at 16:22

## 2018-06-10 RX ADMIN — FAMOTIDINE SCH MG: 10 INJECTION, SOLUTION INTRAVENOUS at 08:47

## 2018-06-10 RX ADMIN — SODIUM CHLORIDE SCH ML: 900 IRRIGANT IRRIGATION at 03:03

## 2018-06-10 RX ADMIN — BISACODYL SCH MG: 10 SUPPOSITORY RECTAL at 08:00

## 2018-06-10 RX ADMIN — SODIUM CHLORIDE SCH MLS/HR: 9 INJECTION, SOLUTION INTRAVENOUS at 19:31

## 2018-06-10 RX ADMIN — SODIUM CHLORIDE SCH MLS/HR: 9 INJECTION, SOLUTION INTRAVENOUS at 11:56

## 2018-06-10 RX ADMIN — LABETALOL HYDROCHLORIDE PRN MG: 5 INJECTION, SOLUTION INTRAVENOUS at 16:22

## 2018-06-10 RX ADMIN — SODIUM CHLORIDE SCH MLS/HR: 9 INJECTION, SOLUTION INTRAVENOUS at 11:31

## 2018-06-10 RX ADMIN — SODIUM CHLORIDE SCH ML: 900 IRRIGANT IRRIGATION at 08:47

## 2018-06-10 RX ADMIN — SODIUM CHLORIDE SCH MLS/HR: 9 INJECTION, SOLUTION INTRAVENOUS at 03:03

## 2018-06-10 RX ADMIN — SODIUM CHLORIDE PRN MG: 900 INJECTION INTRAVENOUS at 12:31

## 2018-06-10 RX ADMIN — HYDROMORPHONE HYDROCHLORIDE PRN MG: 1 INJECTION, SOLUTION INTRAMUSCULAR; INTRAVENOUS; SUBCUTANEOUS at 08:47

## 2018-06-10 RX ADMIN — SODIUM CHLORIDE SCH ML: 900 IRRIGANT IRRIGATION at 15:53

## 2018-06-10 RX ADMIN — HYDROMORPHONE HYDROCHLORIDE PRN MG: 1 INJECTION, SOLUTION INTRAMUSCULAR; INTRAVENOUS; SUBCUTANEOUS at 04:05

## 2018-06-10 RX ADMIN — HYDROMORPHONE HYDROCHLORIDE PRN MG: 1 INJECTION, SOLUTION INTRAMUSCULAR; INTRAVENOUS; SUBCUTANEOUS at 20:39

## 2018-06-10 RX ADMIN — SODIUM CHLORIDE SCH ML: 900 IRRIGANT IRRIGATION at 11:49

## 2018-06-10 RX ADMIN — TAZOBACTAM SODIUM AND PIPERACILLIN SODIUM SCH MLS/HR: 375; 3 INJECTION, SOLUTION INTRAVENOUS at 20:39

## 2018-06-10 RX ADMIN — HYDROMORPHONE HYDROCHLORIDE PRN MG: 1 INJECTION, SOLUTION INTRAMUSCULAR; INTRAVENOUS; SUBCUTANEOUS at 12:32

## 2018-06-10 RX ADMIN — FAMOTIDINE SCH MG: 10 INJECTION, SOLUTION INTRAVENOUS at 16:22

## 2018-06-10 RX ADMIN — SODIUM CHLORIDE SCH ML: 900 IRRIGANT IRRIGATION at 22:48

## 2018-06-10 RX ADMIN — SODIUM CHLORIDE SCH ML: 900 IRRIGANT IRRIGATION at 20:20

## 2018-06-10 RX ADMIN — LABETALOL HYDROCHLORIDE PRN MG: 5 INJECTION, SOLUTION INTRAVENOUS at 12:11

## 2018-06-10 RX ADMIN — TAZOBACTAM SODIUM AND PIPERACILLIN SODIUM SCH MLS/HR: 375; 3 INJECTION, SOLUTION INTRAVENOUS at 04:05

## 2018-06-10 RX ADMIN — LEVOTHYROXINE SODIUM ANHYDROUS SCH MCG: 100 INJECTION, POWDER, LYOPHILIZED, FOR SOLUTION INTRAVENOUS at 08:47

## 2018-06-10 RX ADMIN — BISACODYL SCH MG: 10 SUPPOSITORY RECTAL at 21:09

## 2018-06-10 RX ADMIN — TAZOBACTAM SODIUM AND PIPERACILLIN SODIUM SCH MLS/HR: 375; 3 INJECTION, SOLUTION INTRAVENOUS at 11:56

## 2018-06-10 NOTE — DIAGNOSTIC IMAGING REPORT
EXAM:  ABDOMEN ACUTE SERIES W/PA CXR

DATE: 6/10/2018 8:00 AM  Time stamp on exam: 0611 hours

INDICATION: Small bowel obstruction    

COMPARISON: CT of the abdomen and pelvis on 6/8/2018 and multiple KUBs.





FINDINGS:

LINES/TUBES: NG tube is in good position with tip at the gastric body. Left

chest port is stable with tip at the level of the SVC.



BOWEL PATTERN: No evidence for obstruction.



SOFT TISSUES: Surgical sutures in the lower pelvis.



CHEST: Minimal bibasilar atelectasis.



BONES: No acute findings.



IMPRESSION:

Nonobstructive bowel gas pattern.





Signed by: Dr. Chino Mathews M.D. on 6/10/2018 6:51 AM

## 2018-06-10 NOTE — PROGRESS NOTE
DATE:  Mary 10, 2018, Sunday, at 12:30 p.m.



MEDICINE PROGRESS NOTE



OVERNIGHT:  No acute events.



REVIEW OF SYSTEMS:  Patient denies shortness of breath, dizziness, nausea, 

vomiting, or dizziness at this time. However, patient with complaint of 

midsternal chest pain, rates 6 out of 10 with sudden onset.



OBJECTIVE 

VITAL SIGNS:  T 97.__, P 84, respirations 18, /86. O2 sat on room air 

is 95%.

GENERAL APPEARANCE:  This is an anxious, tired-appearing elderly female 

resting, _______ in bed. 

HEENT:  Normocephalic. Positive NG tube to intermittent low wall suction 

noted to naris. No sinus tenderness. Oral mucosa moist and intact.

CARDIOVASCULAR:  S1 and S2 auscultated. Heart tones distant. No murmur 

appreciated at this time. 

LUNGS:  Moderate breath sounds with fair excursion.

ABDOMEN:  Soft and nondistended. Midabdomen and epigastric area with mild 

tenderness without guarding or rebound.

EXTREMITIES:  No edema or calf tenderness. Positive DP and PT pulses.

NEUROLOGIC:  Alert and oriented x3. Moves all extremities, and gross motor 

skills are intact upon gross observation.

SKIN:  Dry.

PSYCHIATRIC:  Anxious affect.



LABS:  WBCs from day prior 5.0, and H\T\H are 11.8 and 37.5 respectively. 

Chemistry from day prior with sodium 143, potassium 4.1, chloride 108, CO2 

26, gap 13.1, BUN 17, creatinine 0.78. Point-of-care glucose was 94 on that 

day. Hemoglobin A1c noted at 4.9. Lipid panel with triglycerides at 101 and 

cholesterol 101 as well. Both LDL and HDL are noted to be in the lower end 

of the range. Urine is positive for leukocyte esterase trace. 



MEDICATIONS

1. IV Zosyn.

2. NS at 125.

3. IV Synthroid.

4. Pepcid 20 mg.

5. PRN Dilaudid.

6. PRN Zofran.

7. PRN labetalol.



ASSESSMENT AND PLAN:  This is a 63-year-old woman with 

1. Small-bowel obstruction. GI is following, with low intermittent wall 

suction. Notes indicate this may be likely due to adhesions. We will 

continue conservative management with NG tube and ambulation. Continue 

to monitor.

2. History of colon cancer. Will need followup outpatient endoscopy.

3. Hyperglycemia. A1c 5.5 and lipid panel as above.

4. Overweight state. Patient counseling for calorie restriction.

5. Urinary tract infection with low-grade fever. IV antibiotics as 

above.

6. Hypothyroidism. IV Synthroid.

7. Ulcerative colitis. Follow up GI recs. Consider steroids.

8. Hyperlipidemia. Statin on hold at this time.

9. Hypertension. PRN labetalol given x1 this day with a repeat dose due 

to complaint of chest pain. 

10. Prophylaxis. Pepcid IV and SCDs to lower extremities.

11. Disposition. In considering patient's complaint of a sudden onset of 

chest pain upon this provider's arrival at bedside, we will initiate 

serial cardiac enzymes, 12-lead EKG, 

nitroglycerin sublingual spray, and p.r.n. morphine. Will ask Cardiology 

to read 12-lead and provide supplemental oxygen and follow up closely. 

We will continue GI recommendations.



Dictated by:  Mina Durbin NP 









DD:  06/10/2018 12:46

DT:  06/10/2018 13:13

Job#:  [id number]

## 2018-06-11 VITALS — DIASTOLIC BLOOD PRESSURE: 73 MMHG | SYSTOLIC BLOOD PRESSURE: 173 MMHG

## 2018-06-11 VITALS — SYSTOLIC BLOOD PRESSURE: 165 MMHG | DIASTOLIC BLOOD PRESSURE: 75 MMHG

## 2018-06-11 VITALS — DIASTOLIC BLOOD PRESSURE: 93 MMHG | SYSTOLIC BLOOD PRESSURE: 176 MMHG

## 2018-06-11 VITALS — DIASTOLIC BLOOD PRESSURE: 72 MMHG | SYSTOLIC BLOOD PRESSURE: 161 MMHG

## 2018-06-11 VITALS — SYSTOLIC BLOOD PRESSURE: 170 MMHG | DIASTOLIC BLOOD PRESSURE: 78 MMHG

## 2018-06-11 VITALS — SYSTOLIC BLOOD PRESSURE: 190 MMHG | DIASTOLIC BLOOD PRESSURE: 89 MMHG

## 2018-06-11 VITALS — DIASTOLIC BLOOD PRESSURE: 75 MMHG | SYSTOLIC BLOOD PRESSURE: 165 MMHG

## 2018-06-11 LAB
ALBUMIN SERPL-MCNC: 3.1 G/DL (ref 3.5–5)
ALBUMIN/GLOB SERPL: 1.2 {RATIO} (ref 0.8–2)
ALP SERPL-CCNC: 87 IU/L (ref 40–150)
ALT SERPL-CCNC: 20 IU/L (ref 0–55)
ANION GAP SERPL CALC-SCNC: 15.1 MMOL/L (ref 8–16)
BASOPHILS # BLD AUTO: 0 10*3/UL (ref 0–0.1)
BASOPHILS NFR BLD AUTO: 0.4 % (ref 0–1)
BUN SERPL-MCNC: 8 MG/DL (ref 7–26)
BUN/CREAT SERPL: 12 (ref 6–25)
CALCIUM SERPL-MCNC: 8.9 MG/DL (ref 8.4–10.2)
CHLORIDE SERPL-SCNC: 108 MMOL/L (ref 98–107)
CO2 SERPL-SCNC: 22 MMOL/L (ref 22–29)
DEPRECATED NEUTROPHILS # BLD AUTO: 3.3 10*3/UL (ref 2.1–6.9)
EGFRCR SERPLBLD CKD-EPI 2021: > 60 ML/MIN (ref 60–?)
EOSINOPHIL # BLD AUTO: 0.2 10*3/UL (ref 0–0.4)
EOSINOPHIL NFR BLD AUTO: 3.2 % (ref 0–6)
ERYTHROCYTE [DISTWIDTH] IN CORD BLOOD: 13.6 % (ref 11.7–14.4)
GLOBULIN PLAS-MCNC: 2.5 G/DL (ref 2.3–3.5)
GLUCOSE SERPLBLD-MCNC: 71 MG/DL (ref 74–118)
HCT VFR BLD AUTO: 35.2 % (ref 34.2–44.1)
HGB BLD-MCNC: 10.9 G/DL (ref 12–16)
LYMPHOCYTES # BLD: 1.4 10*3/UL (ref 1–3.2)
LYMPHOCYTES NFR BLD AUTO: 25.2 % (ref 18–39.1)
MCH RBC QN AUTO: 28.5 PG (ref 28–32)
MCHC RBC AUTO-ENTMCNC: 31 G/DL (ref 31–35)
MCV RBC AUTO: 91.9 FL (ref 81–99)
MONOCYTES # BLD AUTO: 0.5 10*3/UL (ref 0.2–0.8)
MONOCYTES NFR BLD AUTO: 8.6 % (ref 4.4–11.3)
NEUTS SEG NFR BLD AUTO: 62.2 % (ref 38.7–80)
PLATELET # BLD AUTO: 169 X10E3/UL (ref 140–360)
POTASSIUM SERPL-SCNC: 4.1 MMOL/L (ref 3.5–5.1)
RBC # BLD AUTO: 3.83 X10E6/UL (ref 3.6–5.1)
SODIUM SERPL-SCNC: 141 MMOL/L (ref 136–145)

## 2018-06-11 RX ADMIN — SODIUM CHLORIDE SCH ML: 900 IRRIGANT IRRIGATION at 08:16

## 2018-06-11 RX ADMIN — HYDROMORPHONE HYDROCHLORIDE PRN MG: 1 INJECTION, SOLUTION INTRAMUSCULAR; INTRAVENOUS; SUBCUTANEOUS at 16:40

## 2018-06-11 RX ADMIN — TAZOBACTAM SODIUM AND PIPERACILLIN SODIUM SCH MLS/HR: 375; 3 INJECTION, SOLUTION INTRAVENOUS at 04:15

## 2018-06-11 RX ADMIN — FAMOTIDINE SCH MG: 10 INJECTION, SOLUTION INTRAVENOUS at 16:29

## 2018-06-11 RX ADMIN — SODIUM CHLORIDE SCH MLS/HR: 9 INJECTION, SOLUTION INTRAVENOUS at 03:56

## 2018-06-11 RX ADMIN — SODIUM CHLORIDE SCH ML: 900 IRRIGANT IRRIGATION at 19:45

## 2018-06-11 RX ADMIN — SODIUM CHLORIDE SCH ML: 900 IRRIGANT IRRIGATION at 03:56

## 2018-06-11 RX ADMIN — LEVOTHYROXINE SODIUM ANHYDROUS SCH MCG: 100 INJECTION, POWDER, LYOPHILIZED, FOR SOLUTION INTRAVENOUS at 09:48

## 2018-06-11 RX ADMIN — SODIUM CHLORIDE SCH ML: 900 IRRIGANT IRRIGATION at 11:40

## 2018-06-11 RX ADMIN — LABETALOL HYDROCHLORIDE PRN MG: 5 INJECTION, SOLUTION INTRAVENOUS at 00:42

## 2018-06-11 RX ADMIN — BISACODYL SCH MG: 10 SUPPOSITORY RECTAL at 12:14

## 2018-06-11 RX ADMIN — LABETALOL HYDROCHLORIDE PRN MG: 5 INJECTION, SOLUTION INTRAVENOUS at 20:35

## 2018-06-11 RX ADMIN — LABETALOL HYDROCHLORIDE PRN MG: 5 INJECTION, SOLUTION INTRAVENOUS at 05:09

## 2018-06-11 RX ADMIN — TAZOBACTAM SODIUM AND PIPERACILLIN SODIUM SCH MLS/HR: 375; 3 INJECTION, SOLUTION INTRAVENOUS at 11:40

## 2018-06-11 RX ADMIN — HYDROMORPHONE HYDROCHLORIDE PRN MG: 1 INJECTION, SOLUTION INTRAMUSCULAR; INTRAVENOUS; SUBCUTANEOUS at 00:41

## 2018-06-11 RX ADMIN — HYDROMORPHONE HYDROCHLORIDE PRN MG: 1 INJECTION, SOLUTION INTRAMUSCULAR; INTRAVENOUS; SUBCUTANEOUS at 08:44

## 2018-06-11 RX ADMIN — HYDROMORPHONE HYDROCHLORIDE PRN MG: 1 INJECTION, SOLUTION INTRAMUSCULAR; INTRAVENOUS; SUBCUTANEOUS at 12:15

## 2018-06-11 RX ADMIN — SODIUM CHLORIDE SCH MLS/HR: 9 INJECTION, SOLUTION INTRAVENOUS at 19:31

## 2018-06-11 RX ADMIN — HYDROMORPHONE HYDROCHLORIDE PRN MG: 1 INJECTION, SOLUTION INTRAMUSCULAR; INTRAVENOUS; SUBCUTANEOUS at 21:47

## 2018-06-11 RX ADMIN — SODIUM CHLORIDE SCH ML: 900 IRRIGANT IRRIGATION at 14:42

## 2018-06-11 RX ADMIN — TAZOBACTAM SODIUM AND PIPERACILLIN SODIUM SCH MLS/HR: 375; 3 INJECTION, SOLUTION INTRAVENOUS at 19:41

## 2018-06-11 RX ADMIN — LABETALOL HYDROCHLORIDE PRN MG: 5 INJECTION, SOLUTION INTRAVENOUS at 12:14

## 2018-06-11 RX ADMIN — HYDROMORPHONE HYDROCHLORIDE PRN MG: 1 INJECTION, SOLUTION INTRAMUSCULAR; INTRAVENOUS; SUBCUTANEOUS at 04:15

## 2018-06-11 RX ADMIN — SODIUM CHLORIDE SCH MLS/HR: 9 INJECTION, SOLUTION INTRAVENOUS at 09:48

## 2018-06-11 RX ADMIN — FAMOTIDINE SCH MG: 10 INJECTION, SOLUTION INTRAVENOUS at 08:37

## 2018-06-11 RX ADMIN — LABETALOL HYDROCHLORIDE PRN MG: 5 INJECTION, SOLUTION INTRAVENOUS at 16:29

## 2018-06-11 NOTE — DIAGNOSTIC IMAGING REPORT
EXAM:  ABDOMEN-1VIEW (KUB)

DATE: 6/11/2018 5:00 AM  Time stamp on exam: 0528 hours

INDICATION: Small bowel obstruction    

COMPARISON: Acute abdominal series on 6/10/2018





FINDINGS:

LINES/TUBES: NG tube is in good position with tip at the gastric body. 



BOWEL PATTERN: No evidence for obstruction. Paucity of gas throughout the

abdomen



SOFT TISSUES: Surgical sutures in the lower pelvis.



CHEST: Minimal bibasilar atelectasis.



BONES: No acute findings.



IMPRESSION:

Nonobstructive bowel gas pattern.



Signed by: Dr. Chino Mathews M.D. on 6/11/2018 6:01 AM

## 2018-06-11 NOTE — PROGRESS NOTE
DATE:  June 11, 2018 



TIME:  6:30 a.m.



OVERNIGHT:  No events.  Not passing flatus, but has been ambulating.



REVIEW OF SYSTEMS:  Denies any dizziness or chest pain.



PHYSICAL EXAMINATION 

VITAL SIGNS:  Reviewed.

GENERAL:  A tired-appearing woman resting in bed. 

HEENT:  Anicteric.  NG tube in place. 

CARDIOVASCULAR:  Normal S1 and S2.  

LUNGS:  Moderate breath sounds.  

ABDOMEN:  Soft, nontender and nondistended.  

EXTREMITIES:  No edema or calf tenderness.

NEUROLOGICAL:  Alert and oriented times 3.  Moving all extremities.

SKIN:  Dry.

PSYCHIATRIC:  Normal affect.



LABS:  Reviewed.



MEDICATIONS:  Reviewed.



ASSESSMENT:  A 63-year-old woman with:

1.  Small-bowel obstruction.

2.  Uncontrolled hypertension.

3.  History of colon cancer.

4.  Hyperglycemia.

5.  Overweight state.

6.  Urinary tract infection with low-grade fever.

7.  Hypothyroidism. 

8.  Ulcerative colitis.

9.  Hyperlipidemia.

10. Urinary tract infection. 



PLAN 

1.  Add clonidine patch.

2.  NG tube possible removal today.

3.  Continue ambulation.

4.  Follow up electrolytes this morning.

5.  Hemoglobin A1c 4.9, LDL 48 and triglycerides 101.

6.  Continue Zosyn for urinary tract infection. 









DD:  06/11/2018 07:10

DT:  06/11/2018 07:31

Job#:  [id number]

## 2018-06-12 VITALS — SYSTOLIC BLOOD PRESSURE: 148 MMHG | DIASTOLIC BLOOD PRESSURE: 50 MMHG

## 2018-06-12 VITALS — SYSTOLIC BLOOD PRESSURE: 145 MMHG | DIASTOLIC BLOOD PRESSURE: 70 MMHG

## 2018-06-12 VITALS — DIASTOLIC BLOOD PRESSURE: 72 MMHG | SYSTOLIC BLOOD PRESSURE: 188 MMHG

## 2018-06-12 VITALS — DIASTOLIC BLOOD PRESSURE: 60 MMHG | SYSTOLIC BLOOD PRESSURE: 158 MMHG

## 2018-06-12 VITALS — DIASTOLIC BLOOD PRESSURE: 77 MMHG | SYSTOLIC BLOOD PRESSURE: 194 MMHG

## 2018-06-12 VITALS — SYSTOLIC BLOOD PRESSURE: 167 MMHG | DIASTOLIC BLOOD PRESSURE: 72 MMHG

## 2018-06-12 VITALS — SYSTOLIC BLOOD PRESSURE: 194 MMHG | DIASTOLIC BLOOD PRESSURE: 77 MMHG

## 2018-06-12 VITALS — DIASTOLIC BLOOD PRESSURE: 75 MMHG | SYSTOLIC BLOOD PRESSURE: 169 MMHG

## 2018-06-12 VITALS — DIASTOLIC BLOOD PRESSURE: 88 MMHG | SYSTOLIC BLOOD PRESSURE: 196 MMHG

## 2018-06-12 RX ADMIN — TAZOBACTAM SODIUM AND PIPERACILLIN SODIUM SCH MLS/HR: 375; 3 INJECTION, SOLUTION INTRAVENOUS at 04:00

## 2018-06-12 RX ADMIN — Medication SCH MG: at 21:10

## 2018-06-12 RX ADMIN — HYDROMORPHONE HYDROCHLORIDE PRN MG: 1 INJECTION, SOLUTION INTRAMUSCULAR; INTRAVENOUS; SUBCUTANEOUS at 12:54

## 2018-06-12 RX ADMIN — SODIUM CHLORIDE SCH MLS/HR: 9 INJECTION, SOLUTION INTRAVENOUS at 21:05

## 2018-06-12 RX ADMIN — TAZOBACTAM SODIUM AND PIPERACILLIN SODIUM SCH MLS/HR: 375; 3 INJECTION, SOLUTION INTRAVENOUS at 20:09

## 2018-06-12 RX ADMIN — SODIUM CHLORIDE SCH MLS/HR: 9 INJECTION, SOLUTION INTRAVENOUS at 12:25

## 2018-06-12 RX ADMIN — SODIUM CHLORIDE SCH MLS/HR: 9 INJECTION, SOLUTION INTRAVENOUS at 03:31

## 2018-06-12 RX ADMIN — LABETALOL HYDROCHLORIDE PRN MG: 5 INJECTION, SOLUTION INTRAVENOUS at 17:11

## 2018-06-12 RX ADMIN — LABETALOL HYDROCHLORIDE PRN MG: 5 INJECTION, SOLUTION INTRAVENOUS at 00:05

## 2018-06-12 RX ADMIN — HYDROMORPHONE HYDROCHLORIDE PRN MG: 1 INJECTION, SOLUTION INTRAMUSCULAR; INTRAVENOUS; SUBCUTANEOUS at 01:45

## 2018-06-12 RX ADMIN — FAMOTIDINE SCH MG: 10 INJECTION, SOLUTION INTRAVENOUS at 16:58

## 2018-06-12 RX ADMIN — LABETALOL HYDROCHLORIDE PRN MG: 5 INJECTION, SOLUTION INTRAVENOUS at 07:36

## 2018-06-12 RX ADMIN — SODIUM CHLORIDE SCH MLS/HR: 9 INJECTION, SOLUTION INTRAVENOUS at 19:31

## 2018-06-12 RX ADMIN — Medication SCH MG: at 08:16

## 2018-06-12 RX ADMIN — TAZOBACTAM SODIUM AND PIPERACILLIN SODIUM SCH MLS/HR: 375; 3 INJECTION, SOLUTION INTRAVENOUS at 12:43

## 2018-06-12 RX ADMIN — HYDROMORPHONE HYDROCHLORIDE PRN MG: 1 INJECTION, SOLUTION INTRAMUSCULAR; INTRAVENOUS; SUBCUTANEOUS at 17:10

## 2018-06-12 RX ADMIN — HYDROMORPHONE HYDROCHLORIDE PRN MG: 1 INJECTION, SOLUTION INTRAMUSCULAR; INTRAVENOUS; SUBCUTANEOUS at 21:10

## 2018-06-12 RX ADMIN — HYDROMORPHONE HYDROCHLORIDE PRN MG: 1 INJECTION, SOLUTION INTRAMUSCULAR; INTRAVENOUS; SUBCUTANEOUS at 08:04

## 2018-06-12 RX ADMIN — FAMOTIDINE SCH MG: 10 INJECTION, SOLUTION INTRAVENOUS at 08:03

## 2018-06-12 RX ADMIN — LEVOTHYROXINE SODIUM ANHYDROUS SCH MCG: 100 INJECTION, POWDER, LYOPHILIZED, FOR SOLUTION INTRAVENOUS at 09:40

## 2018-06-12 NOTE — PROGRESS NOTE
DATE:  June 12, 2018 



TIME:  7:37 a.m.



OVERNIGHT:  NG tube removed.



REVIEW OF SYSTEMS:  Denies any dizziness. 



PHYSICAL EXAMINATION 

VITAL SIGNS:  Reviewed.

GENERAL:  A tired-appearing woman resting in bed. 

HEENT:  Anicteric.  

CARDIOVASCULAR:  Normal S1 and S2.  

LUNGS:  Moderate breath sounds.  

ABDOMEN:  Soft and nondistended.  She has soreness on palpation of the 

abdomen.  

EXTREMITIES:  No edema.

SKIN:  Dry.

PSYCHIATRIC:  Flat affect.



LABS:  Reviewed.



MEDICATIONS:  Reviewed.



ASSESSMENT:  A 63-year-old woman with:

1.  Small-bowel obstruction.

2.  Uncontrolled hypertension. 

3.  History of colon cancer.

4.  Hyperglycemia with hemoglobin A1c 4.9, LDL 48.

5.  Overweight state.

6.  Urinary tract infection.

7.  Hypothyroidism.

8.  Ulcerative colitis.

9.  Hyperlipidemia.



PLAN 

1.  Start oral antihypertensive medications.

2.  Ambulate today.

3.  Await full return of bowel function.

4.  Continue clear liquid diet.

5.  Continue antibiotics.  

6.  Control blood pressure.  









DD:  06/12/2018 07:40

DT:  06/12/2018 07:43

Job#:  [id number]

## 2018-06-13 VITALS — DIASTOLIC BLOOD PRESSURE: 63 MMHG | SYSTOLIC BLOOD PRESSURE: 137 MMHG

## 2018-06-13 VITALS — SYSTOLIC BLOOD PRESSURE: 109 MMHG | DIASTOLIC BLOOD PRESSURE: 53 MMHG

## 2018-06-13 VITALS — DIASTOLIC BLOOD PRESSURE: 53 MMHG | SYSTOLIC BLOOD PRESSURE: 109 MMHG

## 2018-06-13 VITALS — DIASTOLIC BLOOD PRESSURE: 63 MMHG | SYSTOLIC BLOOD PRESSURE: 133 MMHG

## 2018-06-13 RX ADMIN — SODIUM CHLORIDE SCH MLS/HR: 9 INJECTION, SOLUTION INTRAVENOUS at 03:31

## 2018-06-13 RX ADMIN — HYDROMORPHONE HYDROCHLORIDE PRN MG: 1 INJECTION, SOLUTION INTRAMUSCULAR; INTRAVENOUS; SUBCUTANEOUS at 07:54

## 2018-06-13 RX ADMIN — HYDROMORPHONE HYDROCHLORIDE PRN MG: 1 INJECTION, SOLUTION INTRAMUSCULAR; INTRAVENOUS; SUBCUTANEOUS at 01:37

## 2018-06-13 RX ADMIN — Medication SCH MG: at 09:55

## 2018-06-13 RX ADMIN — TAZOBACTAM SODIUM AND PIPERACILLIN SODIUM SCH MLS/HR: 375; 3 INJECTION, SOLUTION INTRAVENOUS at 04:00

## 2018-06-13 RX ADMIN — FAMOTIDINE SCH MG: 10 INJECTION, SOLUTION INTRAVENOUS at 09:55

## 2018-06-13 RX ADMIN — LEVOTHYROXINE SODIUM ANHYDROUS SCH MCG: 100 INJECTION, POWDER, LYOPHILIZED, FOR SOLUTION INTRAVENOUS at 09:55

## 2018-06-13 NOTE — DISCHARGE SUMMARY
PRINCIPAL DIAGNOSES 

1.  Small-bowel obstruction, status post spontaneous resolution with 

nasogastric tube.

2.  Uncontrolled hypertension. 

3.  History of colon cancer.

4.  Hyperglycemia.  Hemoglobin A1c 4.9, LDL 48.

5.  Overweight state.

6.  Urinary tract infection.

7.  Hypothyroidism.

8.  Ulcerative colitis.

9.  Hyperlipidemia.



SECONDARY DIAGNOSIS:  Ulcerative colitis.



CHIEF COMPLAINT:  Abdominal pain, nausea and vomiting.



HISTORY OF PRESENT ILLNESS:  This is a 63-year-old woman with abdominal 

pain, nausea and vomiting.  Refer to the H and P for further details.



HOSPITAL COURSE:  Patient was found to have small-bowel obstruction.  She 

had conservative management with NG tube and spontaneously resolved 

obstruction.  Had a bowel movement yesterday.  For her uncontrolled 

hypertension, medication was adjusted.  She had a urinary tract infection 

treated with antibiotics.  Hypothyroidism and history of ulcerative 

colitis.  Hyperglycemia was evaluated with hemoglobin A1c, which was 4.9 

and LDL 48.  Does not have diabetes.  The patient is doing better and 

currently appropriate for discharge.  Will follow up.



DISCHARGE MEDICATIONS:  Per electronic medical records.



FOLLOWUP

1.  Primary care doctor in 1 week. 

2.  Dr. Blue in 2 weeks.

3.  Surgery as directed.



CONDITION ON DISCHARGE:  Stable and improved.



DISCHARGE LOCATION:  Home. 



 



 _________________________________

KRISTIN RIDDLE MD



DD:  06/13/2018 07:57

DT:  06/13/2018 09:39

Job#:  [id number]

## 2018-11-16 ENCOUNTER — HOSPITAL ENCOUNTER (EMERGENCY)
Dept: HOSPITAL 88 - ER | Age: 63
LOS: 1 days | Discharge: HOME | End: 2018-11-17
Payer: COMMERCIAL

## 2018-11-16 VITALS — HEIGHT: 66 IN | WEIGHT: 167 LBS | BODY MASS INDEX: 26.84 KG/M2

## 2018-11-16 DIAGNOSIS — E03.9: ICD-10-CM

## 2018-11-16 DIAGNOSIS — E78.5: ICD-10-CM

## 2018-11-16 DIAGNOSIS — R10.9: Primary | ICD-10-CM

## 2018-11-16 DIAGNOSIS — R11.2: ICD-10-CM

## 2018-11-16 DIAGNOSIS — I10: ICD-10-CM

## 2018-11-16 DIAGNOSIS — Z85.038: ICD-10-CM

## 2018-11-16 LAB
ALBUMIN SERPL-MCNC: 3.9 G/DL (ref 3.5–5)
ALBUMIN/GLOB SERPL: 1.5 {RATIO} (ref 0.8–2)
ALP SERPL-CCNC: 160 IU/L (ref 40–150)
ALT SERPL-CCNC: 32 IU/L (ref 0–55)
ANION GAP SERPL CALC-SCNC: 14.8 MMOL/L (ref 8–16)
BASOPHILS # BLD AUTO: 0 10*3/UL (ref 0–0.1)
BASOPHILS NFR BLD AUTO: 0.4 % (ref 0–1)
BUN SERPL-MCNC: 12 MG/DL (ref 7–26)
BUN/CREAT SERPL: 17 (ref 6–25)
CALCIUM SERPL-MCNC: 9.3 MG/DL (ref 8.4–10.2)
CHLORIDE SERPL-SCNC: 105 MMOL/L (ref 98–107)
CO2 SERPL-SCNC: 27 MMOL/L (ref 22–29)
DEPRECATED NEUTROPHILS # BLD AUTO: 6.7 10*3/UL (ref 2.1–6.9)
EGFRCR SERPLBLD CKD-EPI 2021: > 60 ML/MIN (ref 60–?)
EOSINOPHIL # BLD AUTO: 0.1 10*3/UL (ref 0–0.4)
EOSINOPHIL NFR BLD AUTO: 1.1 % (ref 0–6)
ERYTHROCYTE [DISTWIDTH] IN CORD BLOOD: 13.9 % (ref 11.7–14.4)
GLOBULIN PLAS-MCNC: 2.6 G/DL (ref 2.3–3.5)
GLUCOSE SERPLBLD-MCNC: 109 MG/DL (ref 74–118)
HCT VFR BLD AUTO: 42.5 % (ref 34.2–44.1)
HGB BLD-MCNC: 13.7 G/DL (ref 12–16)
LIPASE SERPL-CCNC: 41 U/L (ref 8–78)
LYMPHOCYTES # BLD: 1.7 10*3/UL (ref 1–3.2)
LYMPHOCYTES NFR BLD AUTO: 18.3 % (ref 18–39.1)
MCH RBC QN AUTO: 29.1 PG (ref 28–32)
MCHC RBC AUTO-ENTMCNC: 32.2 G/DL (ref 31–35)
MCV RBC AUTO: 90.4 FL (ref 81–99)
MONOCYTES # BLD AUTO: 0.6 10*3/UL (ref 0.2–0.8)
MONOCYTES NFR BLD AUTO: 6.5 % (ref 4.4–11.3)
NEUTS SEG NFR BLD AUTO: 73.4 % (ref 38.7–80)
PLATELET # BLD AUTO: 250 X10E3/UL (ref 140–360)
POTASSIUM SERPL-SCNC: 3.8 MMOL/L (ref 3.5–5.1)
RBC # BLD AUTO: 4.7 X10E6/UL (ref 3.6–5.1)
SODIUM SERPL-SCNC: 143 MMOL/L (ref 136–145)

## 2018-11-16 PROCEDURE — 83690 ASSAY OF LIPASE: CPT

## 2018-11-16 PROCEDURE — 80053 COMPREHEN METABOLIC PANEL: CPT

## 2018-11-16 PROCEDURE — 85025 COMPLETE CBC W/AUTO DIFF WBC: CPT

## 2018-11-16 PROCEDURE — 99283 EMERGENCY DEPT VISIT LOW MDM: CPT

## 2018-11-16 PROCEDURE — 74022 RADEX COMPL AQT ABD SERIES: CPT

## 2018-11-16 PROCEDURE — 36415 COLL VENOUS BLD VENIPUNCTURE: CPT

## 2018-11-16 PROCEDURE — 81001 URINALYSIS AUTO W/SCOPE: CPT

## 2018-11-17 VITALS — SYSTOLIC BLOOD PRESSURE: 168 MMHG | DIASTOLIC BLOOD PRESSURE: 81 MMHG

## 2018-11-17 LAB
BACTERIA URNS QL MICRO: (no result) /HPF
BILIRUB UR QL: NEGATIVE
CLARITY UR: (no result)
COLOR UR: YELLOW
DEPRECATED RBC URNS MANUAL-ACNC: (no result) /HPF (ref 0–5)
EPI CELLS URNS QL MICRO: (no result) /LPF
KETONES UR QL STRIP.AUTO: NEGATIVE
LEUKOCYTE ESTERASE UR QL STRIP.AUTO: NEGATIVE
NITRITE UR QL STRIP.AUTO: POSITIVE
PROT UR QL STRIP.AUTO: (no result)
SP GR UR STRIP: 1.01 (ref 1.01–1.02)
UROBILINOGEN UR STRIP-MCNC: 0.2 MG/DL (ref 0.2–1)
WBC #/AREA URNS HPF: (no result) /HPF (ref 0–5)

## 2018-11-17 NOTE — DIAGNOSTIC IMAGING REPORT
EXAM: ABDOMEN ACUTE SERIES W/PA CXR

INDICATION: Midabdominal/periumbilical pain

COMPARISON: AP view of the chest Mary 10, 2018 and abdominal x-ray June 11, 2018



FINDINGS:

LINES/TUBES: Stable left subclavian chest port 



LUNGS: No consolidations or edema. 



PLEURA: No effusions or pneumothorax.



HEART AND MEDIASTINUM: Normal size and contour.



BOWEL PATTERN: Non-obstructed bowel gas pattern.



BONES AND SOFT TISSUES: No acute bone findings. No abnormal calcifications.

Bilateral breast implants. Surgical sutures project over the pelvis.



IMPRESSION:

No acute thoracic abnormality.



No evidence for bowel obstruction.









Signed by: Dr. Naina Molina M.D. on 11/17/2018 12:07 AM

## 2021-05-03 ENCOUNTER — HOSPITAL ENCOUNTER (OUTPATIENT)
Dept: HOSPITAL 88 - OR | Age: 66
Discharge: HOME | End: 2021-05-03
Attending: INTERNAL MEDICINE
Payer: COMMERCIAL

## 2021-05-03 VITALS — SYSTOLIC BLOOD PRESSURE: 113 MMHG | DIASTOLIC BLOOD PRESSURE: 69 MMHG

## 2021-05-03 DIAGNOSIS — Z20.822: ICD-10-CM

## 2021-05-03 DIAGNOSIS — K51.919: ICD-10-CM

## 2021-05-03 DIAGNOSIS — Z12.11: Primary | ICD-10-CM

## 2021-05-03 DIAGNOSIS — Z01.810: ICD-10-CM

## 2021-05-03 DIAGNOSIS — I10: ICD-10-CM

## 2021-05-03 DIAGNOSIS — K59.01: ICD-10-CM

## 2021-05-03 DIAGNOSIS — Z01.812: ICD-10-CM

## 2021-05-03 DIAGNOSIS — Z85.038: ICD-10-CM

## 2021-05-03 DIAGNOSIS — K63.5: ICD-10-CM

## 2021-05-03 PROCEDURE — 93005 ELECTROCARDIOGRAM TRACING: CPT

## 2021-05-03 PROCEDURE — 45380 COLONOSCOPY AND BIOPSY: CPT

## 2021-05-03 PROCEDURE — 45378 DIAGNOSTIC COLONOSCOPY: CPT

## 2021-05-03 PROCEDURE — 45384 COLONOSCOPY W/LESION REMOVAL: CPT
